# Patient Record
Sex: FEMALE | Race: WHITE | NOT HISPANIC OR LATINO
[De-identification: names, ages, dates, MRNs, and addresses within clinical notes are randomized per-mention and may not be internally consistent; named-entity substitution may affect disease eponyms.]

---

## 2021-02-11 PROBLEM — Z00.00 ENCOUNTER FOR PREVENTIVE HEALTH EXAMINATION: Status: ACTIVE | Noted: 2021-02-11

## 2021-02-16 ENCOUNTER — APPOINTMENT (OUTPATIENT)
Dept: OBGYN | Facility: CLINIC | Age: 51
End: 2021-02-16
Payer: COMMERCIAL

## 2021-02-16 VITALS
SYSTOLIC BLOOD PRESSURE: 120 MMHG | HEIGHT: 66 IN | DIASTOLIC BLOOD PRESSURE: 70 MMHG | WEIGHT: 174 LBS | BODY MASS INDEX: 27.97 KG/M2

## 2021-02-16 DIAGNOSIS — Z86.39 PERSONAL HISTORY OF OTHER ENDOCRINE, NUTRITIONAL AND METABOLIC DISEASE: ICD-10-CM

## 2021-02-16 DIAGNOSIS — Z87.09 PERSONAL HISTORY OF OTHER DISEASES OF THE RESPIRATORY SYSTEM: ICD-10-CM

## 2021-02-16 DIAGNOSIS — Z86.79 PERSONAL HISTORY OF OTHER DISEASES OF THE CIRCULATORY SYSTEM: ICD-10-CM

## 2021-02-16 DIAGNOSIS — Z82.49 FAMILY HISTORY OF ISCHEMIC HEART DISEASE AND OTHER DISEASES OF THE CIRCULATORY SYSTEM: ICD-10-CM

## 2021-02-16 PROCEDURE — 99205 OFFICE O/P NEW HI 60 MIN: CPT

## 2021-02-16 PROCEDURE — 99072 ADDL SUPL MATRL&STAF TM PHE: CPT

## 2021-02-18 PROBLEM — Z86.79 HISTORY OF HYPERTENSION: Status: RESOLVED | Noted: 2021-02-18 | Resolved: 2021-02-18

## 2021-02-18 PROBLEM — Z87.09 HISTORY OF ASTHMA: Status: RESOLVED | Noted: 2021-02-18 | Resolved: 2021-02-18

## 2021-02-18 PROBLEM — Z82.49 FH: CAD (CORONARY ARTERY DISEASE): Status: ACTIVE | Noted: 2021-02-18

## 2021-02-18 PROBLEM — Z86.39 HISTORY OF HYPERLIPIDEMIA: Status: RESOLVED | Noted: 2021-02-18 | Resolved: 2021-02-18

## 2021-02-18 RX ORDER — AMLODIPINE BESYLATE 10 MG/1
10 TABLET ORAL
Qty: 90 | Refills: 0 | Status: ACTIVE | COMMUNITY
Start: 2020-10-26

## 2021-02-18 RX ORDER — ATORVASTATIN CALCIUM 40 MG/1
40 TABLET, FILM COATED ORAL
Qty: 30 | Refills: 0 | Status: ACTIVE | COMMUNITY
Start: 2021-01-24

## 2021-02-18 RX ORDER — ALBUTEROL SULFATE 90 UG/1
108 (90 BASE) INHALANT RESPIRATORY (INHALATION)
Qty: 8 | Refills: 0 | Status: ACTIVE | COMMUNITY
Start: 2021-01-13

## 2021-02-18 RX ORDER — CHROMIUM 200 MCG
25 MCG TABLET ORAL
Qty: 30 | Refills: 0 | Status: ACTIVE | COMMUNITY
Start: 2020-12-24

## 2021-02-18 RX ORDER — FLUTICASONE PROPIONATE AND SALMETEROL 50; 100 UG/1; UG/1
100-50 POWDER RESPIRATORY (INHALATION)
Qty: 60 | Refills: 0 | Status: ACTIVE | COMMUNITY
Start: 2021-01-19

## 2021-02-18 RX ORDER — OMEGA-3/DHA/EPA/FISH OIL 300-1000MG
1000 CAPSULE ORAL
Qty: 60 | Refills: 0 | Status: ACTIVE | COMMUNITY
Start: 2020-08-30

## 2021-02-18 RX ORDER — EZETIMIBE 10 MG/1
10 TABLET ORAL
Qty: 90 | Refills: 0 | Status: ACTIVE | COMMUNITY
Start: 2020-10-28

## 2021-02-18 NOTE — HISTORY OF PRESENT ILLNESS
[FreeTextEntry1] : 50 yo presents for consult due to large ovarian cyst, cyst has been growing for at least 3 years.  She occasionally has pain with rapid change of position. She thinks she has had cyst for over 15 years.  [No] : Patient does not have concerns regarding sex

## 2021-02-18 NOTE — PHYSICAL EXAM
[Appropriately responsive] : appropriately responsive [Alert] : alert [No Acute Distress] : no acute distress [No Murmurs] : no murmurs [Soft] : soft [Non-distended] : non-distended [Non-tender] : non-tender [Oriented x3] : oriented x3 [Labia Majora] : normal [Labia Minora] : normal [Normal] : normal [___] : [unfilled] ~Ucm mass on the right [Uterine Adnexae] : normal

## 2021-02-18 NOTE — REASON FOR VISIT
[Consultation] : consultation for [[employer] ] : provided by [employer]   [FreeTextEntry1] : [id number] [TWNoteComboBox1] : [place]

## 2021-04-09 ENCOUNTER — APPOINTMENT (OUTPATIENT)
Dept: DISASTER EMERGENCY | Facility: CLINIC | Age: 51
End: 2021-04-09

## 2021-04-12 ENCOUNTER — APPOINTMENT (OUTPATIENT)
Dept: OBGYN | Facility: AMBULATORY SURGERY CENTER | Age: 51
End: 2021-04-12

## 2021-07-09 ENCOUNTER — APPOINTMENT (OUTPATIENT)
Dept: DISASTER EMERGENCY | Facility: CLINIC | Age: 51
End: 2021-07-09

## 2021-07-10 LAB — SARS-COV-2 N GENE NPH QL NAA+PROBE: NOT DETECTED

## 2021-07-12 ENCOUNTER — OUTPATIENT (OUTPATIENT)
Dept: OUTPATIENT SERVICES | Facility: HOSPITAL | Age: 51
LOS: 1 days | Discharge: HOME | End: 2021-07-12
Payer: MEDICAID

## 2021-07-12 ENCOUNTER — RESULT REVIEW (OUTPATIENT)
Age: 51
End: 2021-07-12

## 2021-07-12 ENCOUNTER — APPOINTMENT (OUTPATIENT)
Dept: OBGYN | Facility: AMBULATORY SURGERY CENTER | Age: 51
End: 2021-07-12

## 2021-07-12 PROCEDURE — 88307 TISSUE EXAM BY PATHOLOGIST: CPT | Mod: 26

## 2021-07-12 PROCEDURE — 88304 TISSUE EXAM BY PATHOLOGIST: CPT | Mod: 26

## 2021-07-12 PROCEDURE — 88112 CYTOPATH CELL ENHANCE TECH: CPT | Mod: 26

## 2021-07-12 PROCEDURE — 88305 TISSUE EXAM BY PATHOLOGIST: CPT | Mod: 26

## 2021-07-12 PROCEDURE — 58661 LAPAROSCOPY REMOVE ADNEXA: CPT | Mod: 50

## 2021-07-13 ENCOUNTER — TRANSCRIPTION ENCOUNTER (OUTPATIENT)
Age: 51
End: 2021-07-13

## 2021-07-14 LAB — NON-GYNECOLOGICAL CYTOLOGY STUDY: SIGNIFICANT CHANGE UP

## 2021-07-21 LAB — SURGICAL PATHOLOGY STUDY: SIGNIFICANT CHANGE UP

## 2021-07-22 ENCOUNTER — NON-APPOINTMENT (OUTPATIENT)
Age: 51
End: 2021-07-22

## 2021-07-29 ENCOUNTER — APPOINTMENT (OUTPATIENT)
Dept: OBGYN | Facility: CLINIC | Age: 51
End: 2021-07-29
Payer: MEDICAID

## 2021-07-29 VITALS — TEMPERATURE: 97.9 F | SYSTOLIC BLOOD PRESSURE: 110 MMHG | DIASTOLIC BLOOD PRESSURE: 70 MMHG

## 2021-07-29 DIAGNOSIS — Z01.818 ENCOUNTER FOR OTHER PREPROCEDURAL EXAMINATION: ICD-10-CM

## 2021-07-29 DIAGNOSIS — N83.209 UNSPECIFIED OVARIAN CYST, UNSPECIFIED SIDE: ICD-10-CM

## 2021-07-29 DIAGNOSIS — Z98.890 OTHER SPECIFIED POSTPROCEDURAL STATES: ICD-10-CM

## 2021-07-29 PROCEDURE — 99024 POSTOP FOLLOW-UP VISIT: CPT

## 2021-07-29 NOTE — HISTORY OF PRESENT ILLNESS
[FreeTextEntry1] : 52 yo presents for postoperative visit s/p laparoscopic right salpingo-oophorectomy and left salpingectomy on 7/12/21.  She reports regular voiding and bowel movements, denies fevers/chills/dysuria.  Not taking pain medication.

## 2021-07-29 NOTE — DISCUSSION/SUMMARY
[FreeTextEntry1] : 52 yo presents for postoperative visit s/p laparoscopic right salpingo-oophorectomy and left salpingectomy on 7/12/21, doing well\par -given copy of pathology and operative report\par -resume all activity without limitation\par -resume routine gyn care with Dr Paramjit Longo\par -follow up PRN

## 2021-07-29 NOTE — PHYSICAL EXAM
[Appropriately responsive] : appropriately responsive [Alert] : alert [No Acute Distress] : no acute distress [Soft] : soft [Non-tender] : non-tender [Oriented x3] : oriented x3 [FreeTextEntry7] : incisions clean, dry, intact